# Patient Record
Sex: MALE | Race: WHITE | NOT HISPANIC OR LATINO | URBAN - METROPOLITAN AREA
[De-identification: names, ages, dates, MRNs, and addresses within clinical notes are randomized per-mention and may not be internally consistent; named-entity substitution may affect disease eponyms.]

---

## 2017-08-21 ENCOUNTER — HOSPITAL ENCOUNTER (EMERGENCY)
Facility: MEDICAL CENTER | Age: 52
End: 2017-08-21
Attending: EMERGENCY MEDICINE
Payer: COMMERCIAL

## 2017-08-21 VITALS
DIASTOLIC BLOOD PRESSURE: 100 MMHG | BODY MASS INDEX: 31.65 KG/M2 | WEIGHT: 233.69 LBS | HEART RATE: 78 BPM | HEIGHT: 72 IN | TEMPERATURE: 97.4 F | SYSTOLIC BLOOD PRESSURE: 135 MMHG | RESPIRATION RATE: 18 BRPM | OXYGEN SATURATION: 99 %

## 2017-08-21 DIAGNOSIS — S01.01XA SCALP LACERATION, INITIAL ENCOUNTER: ICD-10-CM

## 2017-08-21 PROCEDURE — 99283 EMERGENCY DEPT VISIT LOW MDM: CPT

## 2017-08-21 PROCEDURE — 304999 HCHG REPAIR-SIMPLE/INTERMED LEVEL 1

## 2017-08-21 PROCEDURE — 700101 HCHG RX REV CODE 250: Performed by: EMERGENCY MEDICINE

## 2017-08-21 PROCEDURE — 303747 HCHG EXTRA SUTURE

## 2017-08-21 RX ORDER — LIDOCAINE HYDROCHLORIDE 10 MG/ML
20 INJECTION, SOLUTION INFILTRATION; PERINEURAL ONCE
Status: COMPLETED | OUTPATIENT
Start: 2017-08-21 | End: 2017-08-21

## 2017-08-21 RX ORDER — ASPIRIN 81 MG/1
81 TABLET, CHEWABLE ORAL DAILY
COMMUNITY

## 2017-08-21 RX ORDER — LIDOCAINE HYDROCHLORIDE 10 MG/ML
INJECTION, SOLUTION INFILTRATION; PERINEURAL
Status: DISCONTINUED
Start: 2017-08-21 | End: 2017-08-21 | Stop reason: HOSPADM

## 2017-08-21 RX ORDER — DEXTROAMPHETAMINE SACCHARATE, AMPHETAMINE ASPARTATE, DEXTROAMPHETAMINE SULFATE AND AMPHETAMINE SULFATE 5; 5; 5; 5 MG/1; MG/1; MG/1; MG/1
20 TABLET ORAL 2 TIMES DAILY
COMMUNITY

## 2017-08-21 RX ORDER — LOVASTATIN 20 MG/1
20 TABLET ORAL DAILY
COMMUNITY

## 2017-08-21 RX ADMIN — LIDOCAINE HYDROCHLORIDE 20 ML: 10 INJECTION, SOLUTION INFILTRATION; PERINEURAL at 20:00

## 2017-08-21 NOTE — LETTER
FORM C-4:  EMPLOYEE’S CLAIM FOR COMPENSATION/ REPORT OF INITIAL TREATMENT  EMPLOYEE’S CLAIM - PROVIDE ALL INFORMATION REQUESTED   First Name  Andre Last Name  Deyanira Birthdate             Age  1965 51 y.o. Sex  male Claim Number   Home Employee Address  [address]                                     Zip  [address] Height  1.829 m (6') Weight  106 kg (233 lb 11 oz) [facility]     Mailing Employee Address                           [address] 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 (if applicable)  changing out planes    How did this injury or occupational disease occur? Be specific and answer in detail. Use additional sheet if necessary)  Moving medical equipment from one plane to the other stood up to fast under a wing hit head on tie down    If you believe that you have an occupational disease, when did you first have knowledge of the disability and it relationship to your employment?  n/a Witnesses to the Accident  kavon wiggins RN Flight nurse     Nature of Injury or Occupational Disease  Workers' Compensation  Part(s) of Body Injured or Affected  Skull, N/A, N/A    I certify that the above is true and correct to the best of my knowledge and that I have provided this  information in order to obtain the benefits of [place] or [place]).  I hereby authorize any physician, chiropractor, surgeon, practitioner, or other person, any hospital, including [facility] or [facility], any medical service organization, any insurance company, or other institution or organization to release to each other, any medical or other information, including benefits paid or payable, pertinent to this injury or disease, except information relative to diagnosis, treatment and/or counseling for AIDS, psychological conditions, alcohol or controlled substances, for which I must give specific authorization.  A Photostat of this authorization shall be as valid as the original.   Date  8.21.17 Place  [facility] Employee’s Signature   THIS REPORT MUST BE COMPLETED AND MAILED WITHIN 3 WORKING DAYS OF TREATMENT   Place  [facility], EMERGENCY DEPT  Name of Facility   [facility]   Date  8/21/2017 Diagnosis  (S01.01XA) Scalp laceration, initial encounter Is there evidence the injured employee was under the influence of alcohol and/or another controlled substance at the time of accident?   Hour  8:08 PM Description of Injury or Disease  Scalp laceration, initial encounter No   Treatment  Laceration repair  Have you advised the patient to remain off work five days or more?         No   X-Ray Findings      If Yes   From Date    To Date      From information given by the employee, together with medical evidence, can you directly connect this injury or occupational disease as job incurred?  Yes If No, is the employee capable of: Full Duty  Yes Modified Duty      Is additional medical care by a physician indicated?  No If Modified Duty, Specify any Limitations / Restrictions        Do you know of any previous injury or disease contributing to this condition or  "occupational disease?  No   Date  8/21/2017 Print Doctor’s Name  Samson Poole I certify the employer’s copy of this form was mailed on:   Address  [address] [address] [phone] Insurer’s Use Only   [facility]  [address]    Provider’s Tax ID Number  [id number] Telephone  Dept: [phone]    Doctor’s Signature  e-SAMSON Bermudez M.D. Degree   M.D.    Original - TREATING PHYSICIAN OR CHIROPRACTOR   Pg 2-Insurer/TPA   Pg 3-Employer   Pg 4-Employee                                                                                                  Form C-4 (rev01/03)     BRIEF DESCRIPTION OF RIGHTS AND BENEFITS  (Pursuant to [place] 616C.[place])    Notice of Injury or Occupational Disease (Incident Report Form C-1): If an injury or occupational disease (OD) arises out of and in the course of employment, you must provide written notice to your employer as soon as practicable, but no later than 7 days after the accident or OD. Your employer shall maintain a sufficient supply of the required forms.    Claim for Compensation (Form C-4): If medical treatment is sought, the form C-4 is available at the place of initial treatment. A completed \"Claim for Compensation\" (Form C-4) must be filed within 90 days after an accident or OD. The treating physician or chiropractor must, within 3 working days after treatment, complete and mail to the employer, the employer's insurer and third-party , the Claim for Compensation.    Medical Treatment: If you require medical treatment for your on-the-job injury or OD, you may be required to select a physician or chiropractor from a list provided by your workers’ compensation insurer, if it has contracted with an Organization for Managed Care (MCO) or Preferred Provider Organization (PPO) or providers of health care. If your employer has not entered into a contract with an MCO or PPO, you may select a physician or chiropractor from the Panel of " Physicians and Chiropractors. Any medical costs related to your industrial injury or OD will be paid by your insurer.    Temporary Total Disability (TTD): If your doctor has certified that you are unable to work for a period of at least 5 consecutive days, or 5 cumulative days in a 20-day period, or places restrictions on you that your employer does not accommodate, you may be entitled to TTD compensation.    Temporary Partial Disability (TPD): If the wage you receive upon reemployment is less than the compensation for TTD to which you are entitled, the insurer may be required to pay you TPD compensation to make up the difference. TPD can only be paid for a maximum of 24 months.    Permanent Partial Disability (PPD): When your medical condition is stable and there is an indication of a PPD as a result of your injury or OD, within 30 days, your insurer must arrange for an evaluation by a rating physician or chiropractor to determine the degree of your PPD. The amount of your PPD award depends on the date of injury, the results of the PPD evaluation and your age and wage.    Permanent Total Disability (PTD): If you are medically certified by a treating physician or chiropractor as permanently and totally disabled and have been granted a PTD status by your insurer, you are entitled to receive monthly benefits not to exceed 66 2/3% of your average monthly wage. The amount of your PTD payments is subject to reduction if you previously received a PPD award.    Vocational Rehabilitation Services: You may be eligible for vocational rehabilitation services if you are unable to return to the job due to a permanent physical impairment or permanent restrictions as a result of your injury or occupational disease.    Transportation and Per Hossein Reimbursement: You may be eligible for travel expenses and per hossein associated with medical treatment.  Reopening: You may be able to reopen your claim if your condition worsens after  claim closure.    Appeal Process: If you disagree with a written determination issued by the insurer or the insurer does not respond to your request, you may appeal to the Department of Administration, , by following the instructions contained in your determination letter. You must appeal the determination within 70 days from the date of the determination letter at [address], [address], [place], [place] [address], or [address], [address], [place], [place] [address]. If you disagree with the  decision, you may appeal to the Department of Administration, . You must file your appeal within 30 days from the date of the  decision letter at [address], [address], [place], [place] [address], or [address] [facility] [address], [place], [place] [address]. If you disagree with a decision of an , you may file a petition for judicial review with the District Court. You must do so within 30 days of the Appeal Officer’s decision. You may be represented by an  at your own expense or you may contact the [facility] for possible representation.    [place]  for Injured Workers (NAIW): If you disagree with a  decision, you may request that NAIW represent you without charge at an  Hearing. For information regarding denial of benefits, you may contact the NA at: [address], [address], [place] [address], [phone], or [address] [facility] [address], [place] [address], [phone]    To File a Complaint with the Division: If you wish to file a complaint with the  of the Division of Industrial Relations (DIR), please contact the Workers’ Compensation Section, [address], [facility] [address] [facility] [address], [place], [place] [address], telephone [phone], or [address], [place] [address], telephone [phone].    For assistance  with Workers’ Compensation Issues: you may contact the Office of the Governor Consumer Health Assistance, [address] [place] [address], [facility] [address], [place], [place] [address], Toll Free 1-445.694.6764, Web site: http://govcha.[facility].[email or web address]., E-mail [email or web address].[email or web address].                                                                                                                                                                               __________________________________________________________________                                _08.21.17__            Employee Name / Signature                                                                                                                            Date                                       D-2 (rev. 10/07)

## 2017-08-21 NOTE — ED AVS SNAPSHOT
8/21/2017    Andre Mcimllan  [address] [place] [address]    Dear Andre:    [facility] wants to ensure your discharge home is safe and you or your loved ones have had all of your questions answered regarding your care after you leave the hospital.    Below is a list of resources and contact information should you have any questions regarding your hospital stay, follow-up instructions, or active medical symptoms.    Questions or Concerns Regarding… Contact   Medical Questions Related to Your Discharge  (7 days a week, 8am-5pm) Contact a Nurse Care Coordinator   [phone]   Medical Questions Not Related to Your Discharge  (24 hours a day / 7 days a week)  Contact the Nurse Health Line   [phone]    Medications or Discharge Instructions Refer to your discharge packet   or contact your [facility] Primary Care Provider   [phone]   Follow-up Appointment(s) Schedule your appointment via SIMI   or contact Scheduling [phone]   Billing Review your statement via SIMI  or contact Billing [phone]   Medical Records Review your records via SIMI   or contact Medical Records [phone]     You may receive a telephone call within two days of discharge. This call is to make certain you understand your discharge instructions and have the opportunity to have any questions answered. You can also easily access your medical information, test results and upcoming appointments via the SIMI free online health management tool. You can learn more and sign up at [employer]/SIMI. For assistance setting up your SIMI account, please call [phone].    Once again, we want to ensure your discharge home is safe and that you have a clear understanding of any next steps in your care. If you have any questions or concerns, please do not hesitate to contact us, we are here for you. Thank you for choosing [facility] for your healthcare needs.    Sincerely,    Your [facility] Healthcare Team

## 2017-08-21 NOTE — ED AVS SNAPSHOT
Home Care Instructions                                                                                                                Andre Mcmillan   MRN: [id number]    Department:  [facility], Emergency Dept   Date of Visit:  8/21/2017            [facility], Emergency Dept    [address]    [place] [address]    Phone:  [phone]      You were seen by     Samson Poole M.D.      Your Diagnosis Was     Scalp laceration, initial encounter     S01.01XA       Follow-up Information     1. Follow up with [facility].    Contact information    [address] [facility]  [place] [address] [phone]        Medication Information     Review all of your home medications and newly ordered medications with your primary doctor and/or pharmacist as soon as possible. Follow medication instructions as directed by your doctor and/or pharmacist.     Please keep your complete medication list with you and share with your physician. Update the information when medications are discontinued, doses are changed, or new medications (including over-the-counter products) are added; and carry medication information at all times in the event of emergency situations.               Medication List      ASK your doctor about these medications        Instructions    Morning Afternoon Evening Bedtime    amphetamine-dextroamphetamine 20 MG Tabs   Commonly known as:  ADDERALL        Take 20 mg by mouth 2 times a day.   Dose:  20 mg                        aspirin 81 MG Chew chewable tablet   Commonly known as:  ASA        Take 81 mg by mouth every day.   Dose:  81 mg                        lovastatin 20 MG Tabs   Commonly known as:  MEVACOR        Take 20 mg by mouth every day.   Dose:  20 mg                                Patient Information     Patient Information    Following emergency treatment: all patient requiring follow-up care must return either to a private physician or a clinic if your  condition worsens before you are able to obtain further medical attention, please return to the emergency room.     Billing Information    At [facility], we work to make the billing process streamlined for our patients.  Our Representatives are here to answer any questions you may have regarding your hospital bill.  If you have insurance coverage and have supplied your insurance information to us, we will submit a claim to your insurer on your behalf.  Should you have any questions regarding your bill, we can be reached online or by phone as follows:  Online: You are able pay your bills online or live chat with our representatives about any billing questions you may have. We are here to help Monday - Friday from 8:00am to 7:30pm and 9:00am - 12:00pm on Saturdays.  Please visit [email or web address]  for more information.   Phone:  [phone] or [phone]    Please note that your emergency physician, surgeon, pathologist, radiologist, anesthesiologist, and other specialists are not employed by [facility] and will therefore bill separately for their services.  Please contact them directly for any questions concerning their bills at the numbers below:     Emergency Physician Services:  [phone]  [facility]:  [phone]  [facility]:  [phone]  [facility]:  [phone]    1. Your final bill may vary from the amount quoted upon discharge if all procedures are not complete at that time, or if your doctor has additional procedures of which we are not aware. You will receive an additional bill if you return to the Emergency Department at [facility] for suture removal regardless of the facility of which the sutures were placed.     2. Please arrange for settlement of this account at the emergency registration.    3. All self-pay accounts are due in full at the time of treatment.  If you are unable to meet this obligation  then payment is expected within 4-5 days.     4. If you have had radiology studies (CT, X-ray, Ultrasound, MRI), you have received a preliminary result during your emergency department visit. Please contact the radiology department [phone] to receive a copy of your final result. Please discuss the Final result with your primary physician or with the follow up physician provided.     Crisis Hotline:  [place] Crisis Hotline:  6-390-BRSVVXG or 1-623.449.9286  [place] Crisis Hotline:    1-404.166.5644 or 456-600-7133         ED Discharge Follow Up Questions    1. In order to provide you with very good care, we would like to follow up with a phone call in the next few days.  May we have your permission to contact you?     YES /  NO    2. What is the best phone number to call you? (       )_____-__________    3. What is the best time to call you?      Morning  /  Afternoon  /  Evening                   Patient Signature:  ____________________________________________________________    Date:  ____________________________________________________________

## 2017-08-21 NOTE — ED AVS SNAPSHOT
K[employer] Kopo Access Code: [email or web address]  Expires: 9/20/2017  8:11 PM    Kopo Kopo  A secure, online tool to manage your health information     [employer]’s Kopo Kopo® is a secure, online tool that connects you to your personalized health information from the privacy of your home -- day or night - making it very easy for you to manage your healthcare. Once the activation process is completed, you can even access your medical information using the Kopo Kopo jean paul, which is available for free in the Apple Jean Paul store or Google Play store.     Kopo Kopo provides the following levels of access (as shown below):   My Chart Features   [facility] Primary Care Doctor [facility]  Specialists [facility]  Urgent  Care Non-[facility]  Primary Care  Doctor   Email your healthcare team securely and privately 24/7 X X X X   Manage appointments: schedule your next appointment; view details of past/upcoming appointments X      Request prescription refills. X      View recent personal medical records, including lab and immunizations X X X X   View health record, including health history, allergies, medications X X X X   Read reports about your outpatient visits, procedures, consult and ER notes X X X X   See your discharge summary, which is a recap of your hospital and/or ER visit that includes your diagnosis, lab results, and care plan. X X       How to register for Kopo Kopo:  1. Go to  [email or web address].  2. Click on the Sign Up Now box, which takes you to the New Member Sign Up page. You will need to provide the following information:  a. Enter your K[employer] Kopo Access Code exactly as it appears at the top of this page. (You will not need to use this code after you’ve completed the sign-up process. If you do not sign up before the expiration date, you must request a new code.)   b. Enter your date of birth.   c. Enter your home email address.   d. Click Submit, and follow the next screen’s instructions.  3. Create a Kopo Kopo ID. This will be your Kopo Kopo  login ID and cannot be changed, so think of one that is secure and easy to remember.  4. Create a [facility] password. You can change your password at any time.  5. Enter your Password Reset Question and Answer. This can be used at a later time if you forget your password.   6. Enter your e-mail address. This allows you to receive e-mail notifications when new information is available in [facility].  7. Click Sign Up. You can now view your health information.    For assistance activating your [facility] account, call [phone]

## 2017-08-22 NOTE — ED NOTES
Chief Complaint   Patient presents with   • Head Laceration     Patient ambulatory to triage. States that he was changing planes and stood up too quickly hitting the top of his head on the plane. Patient has a laceration to the top of his head. Bleeding controlled by patient prior to arrival. Last tetanus around two years ago. /97 mmHg  Pulse 113  Temp(Src) 36.3 °C (97.4 °F) (Temporal)  Resp 18  Ht 1.829 m (6')  Wt 106 kg (233 lb 11 oz)  BMI 31.69 kg/m2  SpO2 96%

## 2017-08-22 NOTE — ED PROVIDER NOTES
ED Provider Note    Scribed for Samson Poole M.D. by Tabitha Hobson. 8/21/2017, 7:29 PM.    Primary care provider: No primary care provider on file.  Means of arrival: Walk-In  History obtained from: Patient  History limited by: None    CHIEF COMPLAINT  Chief Complaint   Patient presents with   • Head Laceration       HPI  Andre Mcmillan is a 51 y.o. male who presents to the Emergency Department for evaluation of a top of the head laceration happening around 5-5:30 PM. He reports changing planes when he stood up too quickly hitting the top of his head on the plane. Denies LOC. Patient last tetanus was approximately 2 years ago.     REVIEW OF SYSTEMS  See HPI,  Negative for LOC, headache, nausea, emesis.      PAST MEDICAL HISTORY  Past Medical History   Diagnosis Date   • ADHD (attention deficit hyperactivity disorder)    • Hyperlipidemia        SURGICAL HISTORY  patient denies any surgical history    SOCIAL HISTORY  None noted.     FAMILY HISTORY  No family history on file.    CURRENT MEDICATIONS  Reviewed.  See Encounter Summary.     ALLERGIES  No Known Allergies    PHYSICAL EXAM  VITAL SIGNS: /97 mmHg  Pulse 113  Temp(Src) 36.3 °C (97.4 °F) (Temporal)  Resp 18  Ht 1.829 m (6')  Wt 106 kg (233 lb 11 oz)  BMI 31.69 kg/m2  SpO2 96%    Constitutional: Awake, alert in no apparent distress.  HENT: Normocephalic, Bilateral external ears normal. Nose normal. No midline cervical tenderness, Nexus Criteria Negative.   Eyes: Conjunctiva normal, non-icteric, EOMI.    Thorax & Lungs: Easy unlabored respirations  Cardiovascular:    Abdomen: Nondistended   Skin: 2 cm laceration to vertex of the scalp. Visualized skin is dry, No erythema, No rash.   Extremities:   No cyanosis, clubbing or edema  Neurologic: Alert, Grossly non-focal.   Psychiatric: Affect and Mood normal      DIAGNOSTIC STUDIES / PROCEDURES    Laceration Repair Procedure Note    Indication: Laceration    Procedure: The patient was placed  in the appropriate position and anesthesia around the laceration was obtained by infiltration using 1% Lidocaine without epinephrine. The area was then cleansed using Shur-Clens. The laceration was closed with 5-0 Chromic using interrupted sutures. There were no additional lacerations requiring repair. The wound area was then dressed with a sterile dressing.      Total repaired wound length: 2 cm.     Other Items: Suture count: 5    The patient tolerated the procedure well.    Complications: None      COURSE & MEDICAL DECISION MAKING  Nursing notes and vital signs were reviewed. Pertinent Labs & Imaging studies reviewed. (See chart for details)    7:29 PM - Patient seen and examined at bedside. Informed patient that I will wash and clean the wound and rule out any foreign bodies before treating with stitches. Patient will be treated with Xylocaine injection, lidocaine HCL injection.     7:36 PM -infiltrated laceration and performed laceration repair procedure at bedside. Discussed treatment care plan and instructions with the patient. He was given ED return precautions and will be discharged at this time.     The patient was informed of their blood pressure exceeding 120/80 and I have asked them to follow up with their primary care physician to discuss further monitoring and possible treatment.     Decision Making:  This is a 51 y.o. year old male who presents with simple laceration of the scalp.  The laceration was irrigated and closed primarily. Typical wound care precautions were discussed.    The patient's blood pressure is elevated today. >120/80. I have referred them to primary care for follow up.       Filed Vitals:    08/21/17 1856 08/21/17 1920 08/21/17 2016   BP: 139/97  135/100   Pulse: 113  78   Temp: 36.3 °C (97.4 °F)     TempSrc: Temporal     Resp: 18     Height: 1.829 m (6')     Weight:  106 kg (233 lb 11 oz)    SpO2: 96%  99%         DISPOSITION:  Patient will be discharged home in good  condition.    Discharge Medications:  Discharge Medication List as of 8/21/2017  8:12 PM          The patient was discharged home (see d/c instructions) and told to return immediately for any signs or symptoms listed, or any worsening at all.  The patient verbally agreed to the discharge precautions and follow-up plan which is documented in EPIC.    FINAL IMPRESSION  Performed laceration repair procedure, see above.  1. Scalp laceration, initial encounter          Tabitha HOFF (Scribe), am scribing for, and in the presence of, Samson Poole M.D..    Electronically signed by: Tabitha Hobson (Scribe), 8/21/2017    ISamson M.D. personally performed the services described in this documentation, as scribed by Tabitha Hobson in my presence, and it is both accurate and complete.    The note accurately reflects work and decisions made by me.  Samson Poole  8/22/2017  2:10 AM

## 2017-08-22 NOTE — ED NOTES
Patient dc'd to home w/ self. Patient alert and oriented x 4. Patient ambulatory with steady gait. Patient verbalizes understanding of wound/suture care, dc instructions, and follow up care. Patient denies questions upon dc.

## 2017-08-22 NOTE — ED NOTES
Patient arrives to [facility] for head lac. ERP at bedside for wound repair. Patient alert and oriented x 4. Ambulatory on arrival.